# Patient Record
Sex: FEMALE | Race: WHITE | Employment: UNEMPLOYED | URBAN - METROPOLITAN AREA
[De-identification: names, ages, dates, MRNs, and addresses within clinical notes are randomized per-mention and may not be internally consistent; named-entity substitution may affect disease eponyms.]

---
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
## 2024-01-25 ENCOUNTER — OFFICE VISIT (OUTPATIENT)
Dept: BARIATRICS/WEIGHT MGMT | Age: 19
End: 2024-01-25
Payer: COMMERCIAL

## 2024-01-25 VITALS
RESPIRATION RATE: 18 BRPM | SYSTOLIC BLOOD PRESSURE: 120 MMHG | OXYGEN SATURATION: 98 % | HEIGHT: 64 IN | BODY MASS INDEX: 50.02 KG/M2 | HEART RATE: 88 BPM | DIASTOLIC BLOOD PRESSURE: 78 MMHG | WEIGHT: 293 LBS

## 2024-01-25 DIAGNOSIS — Q79.60 EHLERS-DANLOS SYNDROME: ICD-10-CM

## 2024-01-25 DIAGNOSIS — E66.9 DIABETES MELLITUS TYPE 2 IN OBESE (HCC): ICD-10-CM

## 2024-01-25 DIAGNOSIS — E66.01 MORBID OBESITY WITH BMI OF 50.0-59.9, ADULT (HCC): ICD-10-CM

## 2024-01-25 DIAGNOSIS — K21.9 CHRONIC GERD: ICD-10-CM

## 2024-01-25 DIAGNOSIS — E11.69 DIABETES MELLITUS TYPE 2 IN OBESE (HCC): ICD-10-CM

## 2024-01-25 DIAGNOSIS — Z01.818 PREOPERATIVE CLEARANCE: Primary | ICD-10-CM

## 2024-01-25 PROCEDURE — G8427 DOCREV CUR MEDS BY ELIG CLIN: HCPCS | Performed by: SURGERY

## 2024-01-25 PROCEDURE — 99205 OFFICE O/P NEW HI 60 MIN: CPT | Performed by: SURGERY

## 2024-01-25 PROCEDURE — 2022F DILAT RTA XM EVC RTNOPTHY: CPT | Performed by: SURGERY

## 2024-01-25 PROCEDURE — 3046F HEMOGLOBIN A1C LEVEL >9.0%: CPT | Performed by: SURGERY

## 2024-01-25 PROCEDURE — 1036F TOBACCO NON-USER: CPT | Performed by: SURGERY

## 2024-01-25 PROCEDURE — G8417 CALC BMI ABV UP PARAM F/U: HCPCS | Performed by: SURGERY

## 2024-01-25 PROCEDURE — G8484 FLU IMMUNIZE NO ADMIN: HCPCS | Performed by: SURGERY

## 2024-01-25 RX ORDER — DULAGLUTIDE 0.75 MG/.5ML
INJECTION, SOLUTION SUBCUTANEOUS
COMMUNITY
Start: 2024-01-05

## 2024-01-25 RX ORDER — LISINOPRIL 5 MG/1
TABLET ORAL
COMMUNITY
Start: 2022-02-16

## 2024-01-25 RX ORDER — INSULIN LISPRO 200 [IU]/ML
INJECTION, SOLUTION SUBCUTANEOUS
COMMUNITY

## 2024-01-25 RX ORDER — INSULIN LISPRO 100 [IU]/ML
INJECTION, SOLUTION INTRAVENOUS; SUBCUTANEOUS
COMMUNITY
Start: 2022-02-16

## 2024-01-25 RX ORDER — INSULIN GLARGINE 100 [IU]/ML
INJECTION, SOLUTION SUBCUTANEOUS
COMMUNITY

## 2024-01-25 NOTE — PROGRESS NOTES
Joe Khan is a 18 y.o. female with a date of birth of 2005.    Vitals:    01/25/24 0842   BP: 120/78   Pulse: 88   Resp: 18   SpO2: 98%   BMI: Body mass index is 56.51 kg/m². Obesity Classification: Class III    Weight History:   Wt Readings from Last 3 Encounters:   01/25/24 (!) 149.3 kg (329 lb 3.2 oz) (>99 %, Z= 2.98)*   04/09/21 (!) 155.6 kg (343 lb) (>99 %, Z= 2.87)*   02/10/19 (!) 131.5 kg (290 lb) (>99 %, Z= 3.01)*     * Growth percentiles are based on CDC (Girls, 2-20 Years) data.     Patient's lowest adult weight was 298 lbs at age 17.     Patient's highest adult weight was 340 lbs at age 15.     Patient has participated in the following weight loss programs: Peng Restriction, Keto, LC and Nutrition Counseling w/ Dietitian.   Patient has participated in meal replacement/liquid diets.  Patient has participated in weight loss medications- was on Trulicity but felt like she wanted to eat more on it so stopped it 2 weeks ago.    Patient is not lactose intolerant.  Patient does not have Anabaptist/cultural food concerns. Patient does not have food allergies. Patient does tolerate artificial sweeteners.     24 hour recall/food frequency chart:  *usually snacks a lot more  Breakfast: yes. 8a cinnamon rolls (2)  Snack: yes. 1030a cheez its  Lunch: yes. 330p chicken salad on croissant w/ grape salad  Snack: no.   Dinner: yes. 7p grilled chicken salad w/ ranch  Snack: no.   Drinks throughout the day: water / diet soda / diet sports drinks / diet energy drinks / sf fruit juice  Do you drink alcohol? No.     Patient does meet the criteria for binge eating disorder. Patient does have grazing. Patient does not have night eating. Patient does have a history of emotional eating or eating out of boredom.    Surgery  Patient does feel confident in her ability to make these changes.  The patient's expectations of post-surgical eating habits - voices understanding.    Patient states she does understand the consequences

## 2024-01-25 NOTE — PROGRESS NOTES
[facility]   [facility]  Prince Harris MD, FACS, [facility]  [address], [address]    [facility] [address] .  Phone: [phone]  Fax: [phone]       Chief Complaint   Patient presents with    Bariatric, Initial Visit     NP SHERYL Rice           HPI:    Joe Khan is a very pleasant 18 y.o. obese female ,   Body mass index is 56.51 kg/m².  And multiple medical problems who is presenting for weight loss surgery evaluation and consultation by Dr. Pompa, Tania Sosa.    Patient has been struggling for several years now with obesity. Patient feels the weight is an obstacle to achieve and perform things in daily living as well risk on health.     Tries to diet, and exercise but can't keep the weight off.  Patient tried Peng Restriction, Keto, LC and Nutrition Counseling w/ Dietitian.   Patient has participated in meal replacement/liquid diets.  Patient has participated in weight loss medications- was on Trulicity but felt like she wanted to eat more on it so stopped it 2 weeks ago and other regimens, but with no sustainable weight loss.     Patient  is very determined to lose weight and be healthy, and is interested in surgical weight loss for future weight loss.   .    Otherwise patient denies any nausea, vomiting, fevers, chills, shortness of breath, chest pain, constipation or urinary symptoms.        Obesity related problems Joe is dealing with:  Patient Active Problem List    Diagnosis Date Noted    Preoperative clearance 01/25/2024    Diabetes mellitus type 2 in obese ([facility]) 01/25/2024    Morbid obesity with BMI of 50.0-59.9, adult ([facility]) 01/25/2024    Chronic GERD 01/25/2024    García-Danlos syndrome 01/25/2024           Pain Assessment   Denies any abdominal pain     Past Medical History:   Diagnosis Date    Anxiety     Diabetes mellitus type 2 in obese ([facility]) 01/25/2024    EDS (García-Danlos syndrome)     Urinary incontinence      Past Surgical History:   Procedure

## 2024-01-25 NOTE — PATIENT INSTRUCTIONS
Patient received dietary handouts and education.        -Plan for Laparoscopic sleeve gastrectomy      Pre-operative work up Ordered:    - F/U in 4 weeks.   - Nutrition Labs.   - Protein Shake Trial.  - Psych Evaluation.   - Cardiac Clearance.  - EGD (endoscopy to check your stomach).  - Support Group Attendance.   - Obtain letter of medical necessity (PCP Letter).   - Quit Smoking,  Alcohol, Caffeine and Carbonated Drinks  - Obtain records for Weight History 2 yrs.   - Start Regular Exercise and track your activities.   - Start Tracking your food Intake and follow dietary guidelines.   - Avoid Pregnancy for 2 yrs from date of surgery. (for female patients in childbearing age)        Patient advised that its their responsibility to follow up for studies, referrals and/or labs ordered today.

## 2024-01-30 NOTE — PROGRESS NOTES
regular physical exercise for at least 30 minutes 3-5 times per week.  4. Follow-up on battery of labs ordered by bariatric surgery clinic, including CBC, CMP, HbA1c, lipid panel, and TSH.  5. If no concerning findings are seen on TTE, she is welcome to follow-up with me in the future on an as needed basis.       Scribe's attestation:  This note was scribed in the presence of Carlin Sterling DO by Melissa Singer RN         It is a pleasure to assist in the care of Joe Khan. Please call with any questions.  The scribe’s documentation has been prepared under my direction and personally reviewed by me in its entirety.  I confirm that the note above accurately reflects all work, treatment, procedures, and medical decision making performed by me.  I, Dr. Carlin Sterling, personally performed the services described in this documentation as scribed by Melissa Singer RN in my presence, and it is both accurate and complete to the best of our ability.         Carlin Sterling DO  [facility]  [phone] [place] Office  [phone] [facility]

## 2024-01-31 ENCOUNTER — OFFICE VISIT (OUTPATIENT)
Dept: CARDIOLOGY CLINIC | Age: 19
End: 2024-01-31

## 2024-01-31 VITALS
SYSTOLIC BLOOD PRESSURE: 128 MMHG | WEIGHT: 293 LBS | HEART RATE: 109 BPM | HEIGHT: 64 IN | OXYGEN SATURATION: 96 % | DIASTOLIC BLOOD PRESSURE: 78 MMHG | BODY MASS INDEX: 50.02 KG/M2

## 2024-01-31 DIAGNOSIS — Q79.60 EHLERS-DANLOS SYNDROME: ICD-10-CM

## 2024-01-31 DIAGNOSIS — E66.01 MORBID OBESITY (HCC): ICD-10-CM

## 2024-01-31 DIAGNOSIS — Z01.818 PRE-OP EVALUATION: ICD-10-CM

## 2024-01-31 DIAGNOSIS — R00.2 PALPITATIONS: ICD-10-CM

## 2024-01-31 DIAGNOSIS — E11.9 TYPE 2 DIABETES MELLITUS WITHOUT COMPLICATION, UNSPECIFIED WHETHER LONG TERM INSULIN USE (HCC): ICD-10-CM

## 2024-01-31 DIAGNOSIS — Z76.89 ESTABLISHING CARE WITH NEW DOCTOR, ENCOUNTER FOR: Primary | ICD-10-CM

## 2024-01-31 RX ORDER — ATORVASTATIN CALCIUM 20 MG/1
1 TABLET, FILM COATED ORAL DAILY
COMMUNITY
Start: 2023-06-20

## 2024-01-31 NOTE — PATIENT INSTRUCTIONS
Continue current medication regime as prescribed.   Continue to encourage heart healthy, low sodium diet and regular physical exercise for at least 30 minutes a minimum of 3-5 times per week.   Patient is low risk for an adverse perioperative cardiovascular event in the setting of an intermediate risk surgical procedure.  No additional cardiac evaluation is currently indicated prior to proceeding with the planned surgery.    Follow up with me as needed.

## 2024-02-03 ASSESSMENT — ENCOUNTER SYMPTOMS
ABDOMINAL PAIN: 0
DIARRHEA: 0
NAUSEA: 0
SHORTNESS OF BREATH: 0
VOMITING: 0
PHOTOPHOBIA: 0
EYE PAIN: 0
COUGH: 0
RHINORRHEA: 0
CONSTIPATION: 0
SORE THROAT: 0

## 2024-02-05 ENCOUNTER — TELEPHONE (OUTPATIENT)
Dept: CARDIOLOGY CLINIC | Age: 19
End: 2024-02-05

## 2024-02-05 NOTE — TELEPHONE ENCOUNTER
Called patient relayed [facility] message. Patient verbally understood. Number given to central scheduling to make appointment for TTE.

## 2024-02-05 NOTE — TELEPHONE ENCOUNTER
----- Message from Carlin Sterling DO sent at 2/3/2024 12:08 PM EST -----  Regarding: TTE  When we saw this patient in clinic last week, I didn't realize that she carried a diagnosis of García-Danlos syndrome.    It dos not appear that she has ever had a baseline TTE.  She should have a baseline TTE for complete assessment of her cardiac structure and function which I've ordered, but we need to reach out to her to explain this.  If no high risk findings are seen on TTE, she would be considered low risk for surgery.  Thanks, Carlin.

## 2024-02-22 ENCOUNTER — PREP FOR PROCEDURE (OUTPATIENT)
Dept: BARIATRICS/WEIGHT MGMT | Age: 19
End: 2024-02-22

## 2024-02-22 ENCOUNTER — OFFICE VISIT (OUTPATIENT)
Dept: BARIATRICS/WEIGHT MGMT | Age: 19
End: 2024-02-22
Payer: COMMERCIAL

## 2024-02-22 VITALS
HEIGHT: 64 IN | DIASTOLIC BLOOD PRESSURE: 78 MMHG | WEIGHT: 293 LBS | OXYGEN SATURATION: 98 % | SYSTOLIC BLOOD PRESSURE: 124 MMHG | HEART RATE: 109 BPM | RESPIRATION RATE: 18 BRPM | BODY MASS INDEX: 50.02 KG/M2

## 2024-02-22 DIAGNOSIS — E66.9 DIABETES MELLITUS TYPE 2 IN OBESE (HCC): ICD-10-CM

## 2024-02-22 DIAGNOSIS — E11.69 DIABETES MELLITUS TYPE 2 IN OBESE (HCC): ICD-10-CM

## 2024-02-22 DIAGNOSIS — K21.9 CHRONIC GERD: ICD-10-CM

## 2024-02-22 DIAGNOSIS — E66.01 MORBID OBESITY WITH BMI OF 50.0-59.9, ADULT (HCC): Primary | ICD-10-CM

## 2024-02-22 PROCEDURE — G8427 DOCREV CUR MEDS BY ELIG CLIN: HCPCS | Performed by: SURGERY

## 2024-02-22 PROCEDURE — 99214 OFFICE O/P EST MOD 30 MIN: CPT | Performed by: SURGERY

## 2024-02-22 PROCEDURE — 1036F TOBACCO NON-USER: CPT | Performed by: SURGERY

## 2024-02-22 PROCEDURE — 3046F HEMOGLOBIN A1C LEVEL >9.0%: CPT | Performed by: SURGERY

## 2024-02-22 PROCEDURE — 2022F DILAT RTA XM EVC RTNOPTHY: CPT | Performed by: SURGERY

## 2024-02-22 PROCEDURE — G8484 FLU IMMUNIZE NO ADMIN: HCPCS | Performed by: SURGERY

## 2024-02-22 PROCEDURE — G8417 CALC BMI ABV UP PARAM F/U: HCPCS | Performed by: SURGERY

## 2024-02-22 RX ORDER — METFORMIN HYDROCHLORIDE 500 MG/1
500 TABLET, EXTENDED RELEASE ORAL
COMMUNITY
Start: 2022-02-16

## 2024-02-22 RX ORDER — BLOOD SUGAR DIAGNOSTIC
1 STRIP MISCELLANEOUS
COMMUNITY
Start: 2022-02-25

## 2024-02-22 RX ORDER — CALCIUM CITRATE/VITAMIN D3 200MG-6.25
1 TABLET ORAL DAILY
COMMUNITY
Start: 2022-02-17

## 2024-02-22 RX ORDER — GLUCAGON 3 MG/1
POWDER NASAL
COMMUNITY
Start: 2022-02-16

## 2024-02-22 RX ORDER — ACYCLOVIR 400 MG/1
TABLET ORAL
COMMUNITY
Start: 2024-02-01

## 2024-02-22 NOTE — PROGRESS NOTES
[facility]   [facility]  Prince Harris MD, FACS, [address], [address]    [facility] [address] .  Phone: [phone]  Fax: [phone]          Chief Complaint   Patient presents with    Obesity     2nd pre-surg         HPI:     Joe Khan is a very pleasant 18 y.o. female with Body mass index is 56.3 kg/m². / Chronic Obesity.     Joe has been struggling for several years now with obesity. Joe feels the weight is an obstacle to achieve and perform things in daily living as well risk on health.     Patient  is very determined to lose weight and be healthy, and is working towards  surgical weight loss to achieve this goal. Pre-operative clearance and work up pending. Working hard to keep good dietary habits as well level of activity.  Patient denies any nausea, vomiting, fevers, chills, shortness of breath, chest pain, cough, constipation or difficulty urinating.    Pain Assessment   Denies any abdominal pain       Past Medical History:   Diagnosis Date    Anxiety     Diabetes mellitus type 2 in obese ([facility]) 01/25/2024    EDS (García-Danlos syndrome)     Urinary incontinence      Past Surgical History:   Procedure Laterality Date    TONSILLECTOMY AND ADENOIDECTOMY       Family History   Problem Relation Age of Onset    Elevated Lipids Mother     Diabetes Mother     Hypertension Father     Elevated Lipids Father     Mental Illness Father     Heart Disease Maternal Grandmother     Heart Disease Maternal Grandfather      Social History     Tobacco Use    Smoking status: Never    Smokeless tobacco: Never   Substance Use Topics    Alcohol use: No     I counseled the patient on the importance of not smoking and risks of ETOH.   No Known Allergies  Vitals:    02/22/24 0822   BP: 124/78   Pulse: (!) 109   Resp: 18   SpO2: 98%   Weight: (!) 148.8 kg (328 lb)   Height: 1.626 m (5' 4\")       Body mass index is 56.3 kg/m².    Lab Results   Component Value Date/Time

## 2024-02-22 NOTE — PROGRESS NOTES
Joe Khan lost 1.2 lbs over 1 mos.     Breakfast: sausage/alcaraz + toast/ english muffin    Snack: cheez its    Lunch: cheeseburger     Snack: none    Dinner: chx + veg + sometimes rice/potato    Snack: none    Is pt consuming smaller portions? yes portioned plate     Is pt consuming at least 64 oz of fluids per day? yes 4-5 sf splash water + water     Is pt consuming carbonated, caffeinated, or sugary beverages? yes diet coke 1-2/day    Has pt sampled Unjury and/or Nectar protein? Discussed     Has patient attended a support group? Scheduled  April 8th zoom     Exercise: none, wants to start walking     Plan/Recommendations:   - Avoid carbonated beverages  - Try protein powder  - Focus on protein at all meals and snacks     Handouts: none    RODNEY JO, MS, RD, LD

## 2024-02-23 RX ORDER — SODIUM CHLORIDE 0.9 % (FLUSH) 0.9 %
5-40 SYRINGE (ML) INJECTION PRN
Status: CANCELLED | OUTPATIENT
Start: 2024-02-23

## 2024-02-23 RX ORDER — SODIUM CHLORIDE 9 MG/ML
25 INJECTION, SOLUTION INTRAVENOUS PRN
Status: CANCELLED | OUTPATIENT
Start: 2024-02-23

## 2024-02-23 RX ORDER — SODIUM CHLORIDE 0.9 % (FLUSH) 0.9 %
5-40 SYRINGE (ML) INJECTION EVERY 12 HOURS SCHEDULED
Status: CANCELLED | OUTPATIENT
Start: 2024-02-23

## 2024-02-24 PROBLEM — Z01.818 PREOPERATIVE CLEARANCE: Status: RESOLVED | Noted: 2024-01-25 | Resolved: 2024-02-24

## 2024-03-04 ENCOUNTER — TELEPHONE (OUTPATIENT)
Dept: BARIATRICS/WEIGHT MGMT | Age: 19
End: 2024-03-04

## 2024-03-21 ENCOUNTER — OFFICE VISIT (OUTPATIENT)
Dept: BARIATRICS/WEIGHT MGMT | Age: 19
End: 2024-03-21
Payer: COMMERCIAL

## 2024-03-21 VITALS
HEART RATE: 118 BPM | DIASTOLIC BLOOD PRESSURE: 72 MMHG | WEIGHT: 293 LBS | RESPIRATION RATE: 18 BRPM | OXYGEN SATURATION: 98 % | HEIGHT: 64 IN | BODY MASS INDEX: 50.02 KG/M2 | SYSTOLIC BLOOD PRESSURE: 120 MMHG

## 2024-03-21 DIAGNOSIS — K21.9 CHRONIC GERD: Primary | ICD-10-CM

## 2024-03-21 DIAGNOSIS — E66.9 DIABETES MELLITUS TYPE 2 IN OBESE (HCC): ICD-10-CM

## 2024-03-21 DIAGNOSIS — E66.01 MORBID OBESITY WITH BMI OF 50.0-59.9, ADULT (HCC): ICD-10-CM

## 2024-03-21 DIAGNOSIS — E11.69 DIABETES MELLITUS TYPE 2 IN OBESE (HCC): ICD-10-CM

## 2024-03-21 PROCEDURE — 2022F DILAT RTA XM EVC RTNOPTHY: CPT | Performed by: SURGERY

## 2024-03-21 PROCEDURE — G8417 CALC BMI ABV UP PARAM F/U: HCPCS | Performed by: SURGERY

## 2024-03-21 PROCEDURE — G8427 DOCREV CUR MEDS BY ELIG CLIN: HCPCS | Performed by: SURGERY

## 2024-03-21 PROCEDURE — 99214 OFFICE O/P EST MOD 30 MIN: CPT | Performed by: SURGERY

## 2024-03-21 PROCEDURE — 3046F HEMOGLOBIN A1C LEVEL >9.0%: CPT | Performed by: SURGERY

## 2024-03-21 PROCEDURE — G8484 FLU IMMUNIZE NO ADMIN: HCPCS | Performed by: SURGERY

## 2024-03-21 PROCEDURE — 1036F TOBACCO NON-USER: CPT | Performed by: SURGERY

## 2024-03-21 NOTE — PROGRESS NOTES
Joe Khan lost 12 lbs over 1 mos.    Is pt eating at least 4 times everyday? yes 4X per day  B: steak + egg + cheese sandwich  L: grilled chx ranch wrap   S: yogurt + granola + strawberries   D: meat + veg + sometimes potato  S: occasionally cookie/ brownie - watching portions    Is pt eating a lean protein source with all meals and snacks? yes      Has pt decreased their portions using the plate method? yes      Is pt choosing low fat/sugar free options? yes      Is pt drinking at least 64 oz of clear liquids everyday? yes powerade zero , water, CL    Has pt stopped drinking carbonation, caffeinated, and sugar sweetened beverages? No, diet coke    Has pt sampled Unjury and/or Nectar protein? yes tried one, to try more    Has pt attended a support group? Scheduled Apr 8th    Participating in intentional exercise? Not yet, wants to start walking    Plan/Recommendations:   - Continue diet   - Avoid carbonated beverages  - Add in exercise as able    Handouts: none    RODNEY JO, MS, RD, LD  
stomach.      Prediabetes / Diabetes Mellitus Type II in Obese:   [x] Continue to make dietary and lifestyle modifications.  [x] Reviewed the importance of checking blood sugars.  [x] Continue to follow up with their PCP for medication management and monitoring.         Patient advised that its their responsibility to follow up for studies, referrals and/or labs ordered today.

## 2024-04-01 ENCOUNTER — PREP FOR PROCEDURE (OUTPATIENT)
Dept: BARIATRICS/WEIGHT MGMT | Age: 19
End: 2024-04-01

## 2024-04-02 RX ORDER — SODIUM CHLORIDE 9 MG/ML
25 INJECTION, SOLUTION INTRAVENOUS PRN
Status: CANCELLED | OUTPATIENT
Start: 2024-04-02

## 2024-04-02 RX ORDER — SODIUM CHLORIDE 0.9 % (FLUSH) 0.9 %
5-40 SYRINGE (ML) INJECTION PRN
Status: CANCELLED | OUTPATIENT
Start: 2024-04-02

## 2024-04-02 RX ORDER — SODIUM CHLORIDE 0.9 % (FLUSH) 0.9 %
5-40 SYRINGE (ML) INJECTION EVERY 12 HOURS SCHEDULED
Status: CANCELLED | OUTPATIENT
Start: 2024-04-02

## 2024-04-03 NOTE — PROGRESS NOTES
Patient Reached:  Yes_X__   No___    Voicemail Instructions Given: Yes_X__  No___  # Called: [phone]      Date: 4/12/2024      *Arrival Time Per Office      Location: [address] [place], [place]       -Please follow a clear liquid diet the day before the procedure.    -No liquids after midnight. Including no gum, candy or mints.    -Do not take any medications the day of the procedure.    -Follow all instructions that the office has given you.    -You will need a responsible adult to stay on site, and take you home after the procedure.    -Bring a complete list of all your medications and supplements that you currently take. Please include the name and dose of each.    -Dress comfortably.    -Bring Insurance Card and Photo ID.    -Any questions or concerns call Dr. Harris's office at [phone]      -Other            VISITOR POLICY(subject to change):    The current policy is 2 visitors per patient.There are no children allowed.Mask at discretion of facility. Visiting hours are 8a-8p.Overnight visitors will be at the discretion of the nurse. All policies are subject to change.

## 2024-04-08 ENCOUNTER — TELEPHONE (OUTPATIENT)
Dept: BARIATRICS/WEIGHT MGMT | Age: 19
End: 2024-04-08

## 2024-04-08 NOTE — TELEPHONE ENCOUNTER
Spoke with pt reminding pt of egd this Friday Reminded pt of arrival time/ clear liquid diet/ NPO after midnight.  Pt verbalized understanding.

## 2024-04-11 ENCOUNTER — HOSPITAL ENCOUNTER (OUTPATIENT)
Age: 19
Discharge: HOME | End: 2024-04-11
Payer: COMMERCIAL

## 2024-04-11 ENCOUNTER — OFFICE VISIT (OUTPATIENT)
Dept: BARIATRICS/WEIGHT MGMT | Age: 19
End: 2024-04-11
Payer: COMMERCIAL

## 2024-04-11 VITALS
OXYGEN SATURATION: 98 % | WEIGHT: 293 LBS | HEIGHT: 64 IN | HEART RATE: 86 BPM | RESPIRATION RATE: 18 BRPM | BODY MASS INDEX: 50.02 KG/M2 | DIASTOLIC BLOOD PRESSURE: 68 MMHG | SYSTOLIC BLOOD PRESSURE: 118 MMHG

## 2024-04-11 DIAGNOSIS — K21.9 CHRONIC GERD: ICD-10-CM

## 2024-04-11 DIAGNOSIS — E66.01 MORBID OBESITY WITH BMI OF 50.0-59.9, ADULT (HCC): ICD-10-CM

## 2024-04-11 DIAGNOSIS — E11.69 TYPE 2 DIABETES MELLITUS WITH OBESITY (HCC): ICD-10-CM

## 2024-04-11 DIAGNOSIS — E66.01 MORBID OBESITY WITH BMI OF 50.0-59.9, ADULT (HCC): Primary | ICD-10-CM

## 2024-04-11 DIAGNOSIS — E66.9 TYPE 2 DIABETES MELLITUS WITH OBESITY (HCC): ICD-10-CM

## 2024-04-11 DIAGNOSIS — Q79.60 EHLERS-DANLOS SYNDROME: ICD-10-CM

## 2024-04-11 DIAGNOSIS — Z01.818 PREOPERATIVE CLEARANCE: ICD-10-CM

## 2024-04-11 LAB
INR PPP: 0.97 (ref 0.85–1.15)
PROTHROMBIN TIME: 13.1 SEC (ref 11.9–14.9)

## 2024-04-11 PROCEDURE — 83550 IRON BINDING TEST: CPT

## 2024-04-11 PROCEDURE — 3046F HEMOGLOBIN A1C LEVEL >9.0%: CPT | Performed by: SURGERY

## 2024-04-11 PROCEDURE — 2022F DILAT RTA XM EVC RTNOPTHY: CPT | Performed by: SURGERY

## 2024-04-11 PROCEDURE — 80053 COMPREHEN METABOLIC PANEL: CPT

## 2024-04-11 PROCEDURE — 99214 OFFICE O/P EST MOD 30 MIN: CPT | Performed by: SURGERY

## 2024-04-11 PROCEDURE — 85025 COMPLETE CBC W/AUTO DIFF WBC: CPT

## 2024-04-11 PROCEDURE — G8427 DOCREV CUR MEDS BY ELIG CLIN: HCPCS | Performed by: SURGERY

## 2024-04-11 PROCEDURE — 84590 ASSAY OF VITAMIN A: CPT

## 2024-04-11 PROCEDURE — 84446 ASSAY OF VITAMIN E: CPT

## 2024-04-11 PROCEDURE — 83036 HEMOGLOBIN GLYCOSYLATED A1C: CPT

## 2024-04-11 PROCEDURE — 36415 COLL VENOUS BLD VENIPUNCTURE: CPT

## 2024-04-11 PROCEDURE — G8417 CALC BMI ABV UP PARAM F/U: HCPCS | Performed by: SURGERY

## 2024-04-11 PROCEDURE — 84425 ASSAY OF VITAMIN B-1: CPT

## 2024-04-11 PROCEDURE — 84443 ASSAY THYROID STIM HORMONE: CPT

## 2024-04-11 PROCEDURE — 85610 PROTHROMBIN TIME: CPT

## 2024-04-11 PROCEDURE — 82306 VITAMIN D 25 HYDROXY: CPT

## 2024-04-11 PROCEDURE — 1036F TOBACCO NON-USER: CPT | Performed by: SURGERY

## 2024-04-11 PROCEDURE — 80061 LIPID PANEL: CPT

## 2024-04-11 PROCEDURE — 83540 ASSAY OF IRON: CPT

## 2024-04-11 PROCEDURE — 82607 VITAMIN B-12: CPT

## 2024-04-11 PROCEDURE — 82746 ASSAY OF FOLIC ACID SERUM: CPT

## 2024-04-11 NOTE — PROGRESS NOTES
Joe Khan lost 3 lbs over 1 month.     Breakfast: EM w/ sausage or alcaraz   Snack: None  Lunch: Jaylin chix sub from [employer]  Snack: Yogurt OR none  Dinner: Grilled chix + green beans   Snack: None    Is pt consuming smaller portions?  Generally taking less    Is pt consuming at least 64 oz of fluids per day?  4-7 bottles powerade zero, water, CL     Is pt consuming carbonated, caffeinated, or sugary beverages? diet coke 1 bottle every other day    Has pt sampled Unjury and/or Nectar protein? Yes, likes vanilla, chocolate, akins     Has patient attended a support group? Scheduled  5/23 zoom    Exercise: walking in evenings     Plan/Recommendations:   - Choose items with 10 grams or less from sugar and fat per serving   - Eliminate diet coke   - Attend Support Group     Handouts: SG24    Zelda Connolly RD, LD    
No scleral icterus.   Neck:No JVD present.   Pulmonary/Chest: Effort normal. No accessory muscle usage or stridor. No apnea. No respiratory distress.   Cardiovascular: Normal rate and no JVD.   Abdominal: Normal appearance. Patient exhibits no distension.    Musculoskeletal: Normal range of motion. Patient exhibits no edema.   Neurological: Patient is alert and oriented to person, place, and time.  Skin: Skin is warm and dry. No abrasion and no rash noted. Patient is not diaphoretic. No cyanosis or erythema.   Psychiatric: Patient has a normal mood and affect. Speech is normal and behavior is normal.       A/P    Joe Khan is 18 y.o. female, Body mass index is 53.73 kg/m². pre surgery, has  since last visit. The patient underwent extensive dietary counseling.  I have reviewed, discussed and agree with the dietary plan by the registered dietitian . Patient is trying hard to keep good dietary and behavior modifications. Patient is monitoring portion sizes, food choices and liquid calories.  Patient is trying to exercise regularly as much as possible.    Obesity as a disease is considered a high risk to patients overall health and should therefore be considered a high risk disease state.   Advised the patient that not getting there weight under control, that could increase risk of complications/worsening of those conditions on the long-term. (Goal of weight loss surgery is to alleviate/control some of those co-morbidities)    Now with Covid-19 pandemic, CDC and health authorities does classify obese patients as vulnerable and high risk as well.  Which makes weight loss a priority for improvement of their wellbeing and overall health.     I encouraged the patient to continue exercise and keeping healthy eating habits.  Discussed pre-op labs and work up till now. Also counseled the patient extensively on Surgery.     I did explain thoroughly to the patient that compliance with pre- and post op diet and other

## 2024-04-11 NOTE — PATIENT INSTRUCTIONS
Patient received dietary handouts and education.    Plan/Recommendations:   - Choose items with 10 grams or less from sugar and fat per serving   - Eliminate diet coke   - Attend Support Group

## 2024-04-12 ENCOUNTER — ANESTHESIA (OUTPATIENT)
Dept: ENDOSCOPY | Age: 19
End: 2024-04-12
Payer: COMMERCIAL

## 2024-04-12 ENCOUNTER — HOSPITAL ENCOUNTER (OUTPATIENT)
Age: 19
Setting detail: OUTPATIENT SURGERY
Discharge: HOME | End: 2024-04-12
Attending: SURGERY | Admitting: SURGERY
Payer: COMMERCIAL

## 2024-04-12 ENCOUNTER — ANESTHESIA EVENT (OUTPATIENT)
Dept: ENDOSCOPY | Age: 19
End: 2024-04-12
Payer: COMMERCIAL

## 2024-04-12 VITALS
SYSTOLIC BLOOD PRESSURE: 119 MMHG | WEIGHT: 293 LBS | RESPIRATION RATE: 24 BRPM | DIASTOLIC BLOOD PRESSURE: 83 MMHG | BODY MASS INDEX: 50.02 KG/M2 | HEART RATE: 92 BPM | TEMPERATURE: 98.2 F | OXYGEN SATURATION: 98 % | HEIGHT: 64 IN

## 2024-04-12 DIAGNOSIS — K21.9 CHRONIC GERD: ICD-10-CM

## 2024-04-12 LAB
25(OH)D3 SERPL-MCNC: 28.7 NG/ML
ALBUMIN SERPL-MCNC: 4.4 G/DL (ref 3.4–5)
ALBUMIN/GLOB SERPL: 1.5 {RATIO} (ref 1.1–2.2)
ALP SERPL-CCNC: 70 U/L (ref 40–129)
ALT SERPL-CCNC: 23 U/L (ref 10–40)
ANION GAP SERPL CALCULATED.3IONS-SCNC: 16 MMOL/L (ref 3–16)
AST SERPL-CCNC: 21 U/L (ref 15–37)
BASOPHILS # BLD: 0.1 K/UL (ref 0–0.2)
BASOPHILS NFR BLD: 0.8 %
BILIRUB SERPL-MCNC: 0.3 MG/DL (ref 0–1)
BUN SERPL-MCNC: 8 MG/DL (ref 7–20)
CALCIUM SERPL-MCNC: 9.5 MG/DL (ref 8.3–10.6)
CHLORIDE SERPL-SCNC: 99 MMOL/L (ref 99–110)
CHOLEST SERPL-MCNC: 203 MG/DL (ref 0–199)
CO2 SERPL-SCNC: 23 MMOL/L (ref 21–32)
CREAT SERPL-MCNC: 0.5 MG/DL (ref 0.6–1.1)
DEPRECATED RDW RBC AUTO: 14.1 % (ref 12.4–15.4)
EOSINOPHIL # BLD: 0.2 K/UL (ref 0–0.6)
EOSINOPHIL NFR BLD: 2.2 %
EST. AVERAGE GLUCOSE BLD GHB EST-MCNC: 297.7 MG/DL
FOLATE SERPL-MCNC: 8.71 NG/ML (ref 4.78–24.2)
GFR SERPLBLD CREATININE-BSD FMLA CKD-EPI: >90 ML/MIN/{1.73_M2}
GLUCOSE BLD-MCNC: 207 MG/DL (ref 70–99)
GLUCOSE BLD-MCNC: 287 MG/DL (ref 70–99)
GLUCOSE SERPL-MCNC: 233 MG/DL (ref 70–99)
HBA1C MFR BLD: 12 %
HCG UR QL: NEGATIVE
HCT VFR BLD AUTO: 42.9 % (ref 36–48)
HDLC SERPL-MCNC: 31 MG/DL (ref 40–60)
HGB BLD-MCNC: 14.5 G/DL (ref 12–16)
IRON SATN MFR SERPL: 16 % (ref 15–50)
IRON SERPL-MCNC: 53 UG/DL (ref 37–145)
LDLC SERPL CALC-MCNC: 139 MG/DL
LYMPHOCYTES # BLD: 2.6 K/UL (ref 1–5.1)
LYMPHOCYTES NFR BLD: 28 %
MCH RBC QN AUTO: 27.5 PG (ref 26–34)
MCHC RBC AUTO-ENTMCNC: 33.9 G/DL (ref 31–36)
MCV RBC AUTO: 81.3 FL (ref 80–100)
MONOCYTES # BLD: 0.3 K/UL (ref 0–1.3)
MONOCYTES NFR BLD: 3.6 %
NEUTROPHILS # BLD: 6.1 K/UL (ref 1.7–7.7)
NEUTROPHILS NFR BLD: 65.4 %
PERFORMED ON: ABNORMAL
PERFORMED ON: ABNORMAL
PLATELET # BLD AUTO: 425 K/UL (ref 135–450)
PMV BLD AUTO: 8.4 FL (ref 5–10.5)
POTASSIUM SERPL-SCNC: 4.3 MMOL/L (ref 3.5–5.1)
PROT SERPL-MCNC: 7.4 G/DL (ref 6.4–8.2)
RBC # BLD AUTO: 5.28 M/UL (ref 4–5.2)
SODIUM SERPL-SCNC: 138 MMOL/L (ref 136–145)
TIBC SERPL-MCNC: 328 UG/DL (ref 260–445)
TRIGL SERPL-MCNC: 163 MG/DL (ref 0–150)
TSH SERPL DL<=0.005 MIU/L-ACNC: 1.89 UIU/ML (ref 0.43–4)
VIT B12 SERPL-MCNC: 903 PG/ML (ref 211–911)
VLDLC SERPL CALC-MCNC: 33 MG/DL
WBC # BLD AUTO: 9.4 K/UL (ref 4–11)

## 2024-04-12 PROCEDURE — 6370000000 HC RX 637 (ALT 250 FOR IP): Performed by: STUDENT IN AN ORGANIZED HEALTH CARE EDUCATION/TRAINING PROGRAM

## 2024-04-12 PROCEDURE — 6360000002 HC RX W HCPCS: Performed by: NURSE ANESTHETIST, CERTIFIED REGISTERED

## 2024-04-12 PROCEDURE — 2500000003 HC RX 250 WO HCPCS: Performed by: NURSE ANESTHETIST, CERTIFIED REGISTERED

## 2024-04-12 PROCEDURE — 2709999900 HC NON-CHARGEABLE SUPPLY: Performed by: SURGERY

## 2024-04-12 PROCEDURE — 7100000000 HC PACU RECOVERY - FIRST 15 MIN: Performed by: SURGERY

## 2024-04-12 PROCEDURE — 7100000011 HC PHASE II RECOVERY - ADDTL 15 MIN: Performed by: SURGERY

## 2024-04-12 PROCEDURE — 7100000010 HC PHASE II RECOVERY - FIRST 15 MIN: Performed by: SURGERY

## 2024-04-12 PROCEDURE — 3700000000 HC ANESTHESIA ATTENDED CARE: Performed by: SURGERY

## 2024-04-12 PROCEDURE — 88305 TISSUE EXAM BY PATHOLOGIST: CPT

## 2024-04-12 PROCEDURE — 2580000003 HC RX 258: Performed by: SURGERY

## 2024-04-12 PROCEDURE — 3609012400 HC EGD TRANSORAL BIOPSY SINGLE/MULTIPLE: Performed by: SURGERY

## 2024-04-12 PROCEDURE — 7100000001 HC PACU RECOVERY - ADDTL 15 MIN: Performed by: SURGERY

## 2024-04-12 PROCEDURE — 84703 CHORIONIC GONADOTROPIN ASSAY: CPT

## 2024-04-12 PROCEDURE — 43239 EGD BIOPSY SINGLE/MULTIPLE: CPT | Performed by: SURGERY

## 2024-04-12 RX ORDER — SODIUM CHLORIDE 0.9 % (FLUSH) 0.9 %
5-40 SYRINGE (ML) INJECTION PRN
Status: DISCONTINUED | OUTPATIENT
Start: 2024-04-12 | End: 2024-04-12 | Stop reason: HOSPADM

## 2024-04-12 RX ORDER — OMEPRAZOLE 20 MG/1
20 CAPSULE, DELAYED RELEASE ORAL
Qty: 90 CAPSULE | Refills: 1 | Status: SHIPPED | OUTPATIENT
Start: 2024-04-12

## 2024-04-12 RX ORDER — PROPOFOL 10 MG/ML
INJECTION, EMULSION INTRAVENOUS PRN
Status: DISCONTINUED | OUTPATIENT
Start: 2024-04-12 | End: 2024-04-12 | Stop reason: SDUPTHER

## 2024-04-12 RX ORDER — SODIUM CHLORIDE 9 MG/ML
INJECTION, SOLUTION INTRAVENOUS CONTINUOUS
Status: DISCONTINUED | OUTPATIENT
Start: 2024-04-12 | End: 2024-04-12 | Stop reason: HOSPADM

## 2024-04-12 RX ORDER — GLUCAGON 1 MG/ML
1 KIT INJECTION PRN
Status: DISCONTINUED | OUTPATIENT
Start: 2024-04-12 | End: 2024-04-12 | Stop reason: HOSPADM

## 2024-04-12 RX ORDER — LIDOCAINE HYDROCHLORIDE 20 MG/ML
INJECTION, SOLUTION INFILTRATION; PERINEURAL PRN
Status: DISCONTINUED | OUTPATIENT
Start: 2024-04-12 | End: 2024-04-12 | Stop reason: SDUPTHER

## 2024-04-12 RX ORDER — ESMOLOL HYDROCHLORIDE 10 MG/ML
INJECTION INTRAVENOUS PRN
Status: DISCONTINUED | OUTPATIENT
Start: 2024-04-12 | End: 2024-04-12 | Stop reason: SDUPTHER

## 2024-04-12 RX ORDER — MIDAZOLAM HYDROCHLORIDE 1 MG/ML
INJECTION INTRAMUSCULAR; INTRAVENOUS PRN
Status: DISCONTINUED | OUTPATIENT
Start: 2024-04-12 | End: 2024-04-12 | Stop reason: SDUPTHER

## 2024-04-12 RX ORDER — DEXTROSE MONOHYDRATE 100 MG/ML
INJECTION, SOLUTION INTRAVENOUS CONTINUOUS PRN
Status: DISCONTINUED | OUTPATIENT
Start: 2024-04-12 | End: 2024-04-12 | Stop reason: HOSPADM

## 2024-04-12 RX ORDER — SODIUM CHLORIDE 0.9 % (FLUSH) 0.9 %
5-40 SYRINGE (ML) INJECTION EVERY 12 HOURS SCHEDULED
Status: DISCONTINUED | OUTPATIENT
Start: 2024-04-12 | End: 2024-04-12 | Stop reason: HOSPADM

## 2024-04-12 RX ORDER — KETAMINE HCL IN NACL, ISO-OSM 100MG/10ML
SYRINGE (ML) INJECTION PRN
Status: DISCONTINUED | OUTPATIENT
Start: 2024-04-12 | End: 2024-04-12 | Stop reason: SDUPTHER

## 2024-04-12 RX ORDER — SODIUM CHLORIDE 9 MG/ML
25 INJECTION, SOLUTION INTRAVENOUS PRN
Status: DISCONTINUED | OUTPATIENT
Start: 2024-04-12 | End: 2024-04-12 | Stop reason: HOSPADM

## 2024-04-12 RX ADMIN — Medication 25 MG: at 07:53

## 2024-04-12 RX ADMIN — PROPOFOL 30 MG: 10 INJECTION, EMULSION INTRAVENOUS at 07:57

## 2024-04-12 RX ADMIN — LIDOCAINE HYDROCHLORIDE 100 MG: 20 INJECTION, SOLUTION INFILTRATION; PERINEURAL at 07:53

## 2024-04-12 RX ADMIN — MIDAZOLAM 2 MG: 1 INJECTION INTRAMUSCULAR; INTRAVENOUS at 07:49

## 2024-04-12 RX ADMIN — PROPOFOL 80 MG: 10 INJECTION, EMULSION INTRAVENOUS at 07:53

## 2024-04-12 RX ADMIN — INSULIN HUMAN 5 UNITS: 100 INJECTION, SOLUTION PARENTERAL at 06:59

## 2024-04-12 RX ADMIN — PROPOFOL 30 MG: 10 INJECTION, EMULSION INTRAVENOUS at 07:55

## 2024-04-12 RX ADMIN — SODIUM CHLORIDE: 9 INJECTION, SOLUTION INTRAVENOUS at 06:51

## 2024-04-12 RX ADMIN — Medication 25 MG: at 07:54

## 2024-04-12 RX ADMIN — ESMOLOL HYDROCHLORIDE 10 MG: 10 INJECTION, SOLUTION INTRAVENOUS at 07:59

## 2024-04-12 ASSESSMENT — PAIN - FUNCTIONAL ASSESSMENT: PAIN_FUNCTIONAL_ASSESSMENT: 0-10

## 2024-04-12 NOTE — ANESTHESIA POSTPROCEDURE EVALUATION
Department of Anesthesiology  Postprocedure Note    Patient: Joe Khan  MRN: [id number]  YOB: 2005  Date of evaluation: 4/12/2024    Procedure Summary       Date: 04/12/24 Room / Location: [place] [address] / [facility]    Anesthesia Start: 0749 Anesthesia Stop: 0805    Procedure: ESOPHAGOGASTRODUODENOSCOPY BIOPSY (Abdomen) Diagnosis:       Chronic GERD      (Chronic GERD [K21.9])    Surgeons: Prince Harris MD Responsible Provider: Noe Carreon MD    Anesthesia Type: MAC ASA Status: 3            Anesthesia Type: No value filed.    Hemanth Phase I: Hemanth Score: 10    Hemanth Phase II: Hemanth Score: 10    Anesthesia Post Evaluation    Patient location during evaluation: PACU  Patient participation: complete - patient participated  Level of consciousness: awake and alert  Airway patency: patent  Nausea & Vomiting: no nausea and no vomiting  Cardiovascular status: blood pressure returned to baseline  Respiratory status: acceptable  Hydration status: euvolemic  Multimodal analgesia pain management approach  Pain management: adequate    No notable events documented.

## 2024-04-12 NOTE — PROGRESS NOTES
Pt arrived from ENDO to PACU bay 8. Reported received from ENDO staff. Pt arousable to voice. Pt on RA, NSR/ Tachy, and VSS.

## 2024-04-12 NOTE — PROGRESS NOTES
Reviewed problem list, assessment, H&P, and checklist preoperatively.  Scope verified using 2 person system.

## 2024-04-12 NOTE — PROGRESS NOTES
Discharge instructions review with patient and mother Trace. Pt discharged via wheelchair. Pt discharged with all belongings. Trace taking stable pt home.

## 2024-04-12 NOTE — PROGRESS NOTES
Did report blood glucose of 287 to Dr Oseguera anesthesia. Did receive order for 5U of regular insulin, also to inform Dr Carreon

## 2024-04-12 NOTE — PROGRESS NOTES
Pt awake and alert at this time. Pt on RA, and VSS. Pt denies pain and nausea, tolerating PO. Pt meets criteria to be discharged from Phase 1.

## 2024-04-12 NOTE — ANESTHESIA PRE PROCEDURE
Department of Anesthesiology  Preprocedure Note       Name:  Joe Khan   Age:  18 y.o.  :  2005                                          MRN:  [id number]         Date:  2024      Surgeon: Surgeon(s):  Prince Harris MD    Procedure: Procedure(s):  ESOPHAGOGASTRODUODENOSCOPY    Medications prior to admission:   Prior to Admission medications    Medication Sig Start Date End Date Taking? Authorizing Provider   metFORMIN (GLUCOPHAGE-XR) 500 MG extended release tablet Take 1 tablet by mouth 22   Mejia Cerda MD   blood glucose test strips (TRUE METRIX BLOOD GLUCOSE TEST) strip 1 each daily 22   Mejia Cerda MD   blood glucose test strips (ONETOUCH VERIO) strip 1 each 22   Mejia Cerda MD   Continuous Blood Gluc Sensor (DEXCOM G7 SENSOR) MIS APPLY 1 NEW SENSOR TOPICALLY EVERY 10 DAYS 24   Mejia Cerda MD   Glucagon (BAQSIMI ONE PACK) 3 MG/DOSE POWD Use as directed for treatment of severe low BG/unable to eat drink/unconscious 22   Mejia Cerda MD   atorvastatin (LIPITOR) 20 MG tablet Take 1 tablet by mouth daily 23   Mejia Cerda MD   TRULICITY 0.75 MG/0.5ML SOPN INJECT 3/4 (THREE-FOURTHS) MG SUBCUTANEOUSLY  ONCE A WEEK 24   Mejia Cerda MD   LANTUS SOLOSTAR 100 UNIT/ML injection pen INJECT 60 UNITS SUBCUTANEOUSLY NIGHTLY    Mejia Cerda MD   HUMALOG KWIKPEN 200 UNIT/ML SOPN pen GIVE 12-20 UNITS SUBQ THREE TIMES A DAY PER SLIDING SCALE    Mejia Cerda MD   insulin lispro, 1 Unit Dial, (HUMALOG/ADMELOG) 100 UNIT/ML SOPN Variable dose - up to 100 unit(s) per day - used multiple times daily for bolus and correction 22   Mejia Cerda MD   lisinopril (PRINIVIL;ZESTRIL) 5 MG tablet 1qd 22   Mejia Cerda MD       Current medications:    Current Facility-Administered Medications   Medication Dose Route Frequency Provider Last Rate Last Admin   • sodium chloride flush

## 2024-04-12 NOTE — H&P
Department of General Surgery - Adult Surgical Service   [facility]   [employer]  Attending Pre-operative History and Physical      DIAGNOSIS:  Obesity    INDICATION:  Pre-op    PROCEDURE:  EGD    CHIEF COMPLAINT:  Obesity    History Obtained From:  patient    HISTORY OF PRESENT ILLNESS:    The patient is a 18 y.o. female with significant past medical history of   Patient Active Problem List   Diagnosis    Type 2 diabetes mellitus with obesity (HCC)    Morbid obesity with BMI of 50.0-59.9, adult (HCC)    Chronic GERD    García-Danlos syndrome      who presents for pre-op EGD    Past Medical History:        Diagnosis Date    Anxiety     Diabetes mellitus type 2 in obese 01/25/2024    EDS (García-Danlos syndrome)     Urinary incontinence      Past Surgical History:        Procedure Laterality Date    TONSILLECTOMY AND ADENOIDECTOMY       Medications Prior to Admission:   Medications Prior to Admission: metFORMIN (GLUCOPHAGE-XR) 500 MG extended release tablet, Take 1 tablet by mouth  blood glucose test strips (TRUE METRIX BLOOD GLUCOSE TEST) strip, 1 each daily  blood glucose test strips (ONETOUCH VERIO) strip, 1 each  Continuous Blood Gluc Sensor (DEXCOM G7 SENSOR) MIS, APPLY 1 NEW SENSOR TOPICALLY EVERY 10 DAYS  Glucagon (BAQSIMI ONE PACK) 3 MG/DOSE POWD, Use as directed for treatment of severe low BG/unable to eat drink/unconscious  atorvastatin (LIPITOR) 20 MG tablet, Take 1 tablet by mouth daily  TRULICITY 0.75 MG/0.5ML SOPN, INJECT 3/4 (THREE-FOURTHS) MG SUBCUTANEOUSLY  ONCE A WEEK  LANTUS SOLOSTAR 100 UNIT/ML injection pen, INJECT 60 UNITS SUBCUTANEOUSLY NIGHTLY  HUMALOG KWIKPEN 200 UNIT/ML SOPN pen, GIVE 12-20 UNITS SUBQ THREE TIMES A DAY PER SLIDING SCALE  insulin lispro, 1 Unit Dial, (HUMALOG/ADMELOG) 100 UNIT/ML SOPN, Variable dose - up to 100 unit(s) per day - used multiple times daily for bolus and correction  lisinopril (PRINIVIL;ZESTRIL) 5 MG tablet, 1qd    Allergies:   normal range of motion. No JVD present.   Pulmonary/Chest: Effort normal. No accessory muscle usage or stridor. No apnea. No respiratory distress.   Cardiovascular: Normal rate and no JVD.   Abdominal: Normal appearance. Patient exhibits no distension.   Musculoskeletal: Normal range of motion. Patient exhibits no edema.   Neurological: Patient is alert and oriented to person, place, and time. Patient has normal strength. GCS eye subscore is 4. GCS verbal subscore is 5. GCS motor subscore is 6.   Skin: Skin is warm and dry. No abrasion and no rash noted. Patient is not diaphoretic. No cyanosis or erythema.   Psychiatric: Patient has a normal mood and affect. Speech is normal and behavior is normal. Cognition and memory are normal.       DATA:  CBC:   Lab Results   Component Value Date/Time    WBC 8.8 04/08/2021 11:15 PM    RBC 4.83 04/08/2021 11:15 PM    HGB 13.2 04/08/2021 11:15 PM    HCT 39.6 04/08/2021 11:15 PM    MCV 81.9 04/08/2021 11:15 PM    MCH 27.4 04/08/2021 11:15 PM    MCHC 33.4 04/08/2021 11:15 PM    RDW 15.2 04/08/2021 11:15 PM     04/08/2021 11:15 PM    MPV 8.4 04/08/2021 11:15 PM     CMP:    Lab Results   Component Value Date/Time     04/08/2021 11:15 PM    K 3.7 04/08/2021 11:15 PM    CL 94 04/08/2021 11:15 PM    CO2 20 04/08/2021 11:15 PM    BUN 8 04/08/2021 11:15 PM    CREATININE 0.7 04/08/2021 11:15 PM    GFRAA >60 04/08/2021 11:15 PM    AGRATIO 1.4 04/08/2021 11:15 PM    LABGLOM >60 04/08/2021 11:15 PM    GLUCOSE 572 04/08/2021 11:15 PM    PROT 7.2 04/08/2021 11:15 PM    LABALBU 4.2 04/08/2021 11:15 PM    CALCIUM 9.6 04/08/2021 11:15 PM    BILITOT <0.2 04/08/2021 11:15 PM    ALKPHOS 100 04/08/2021 11:15 PM    AST 25 04/08/2021 11:15 PM    ALT 25 04/08/2021 11:15 PM       ASSESSMENT AND PLAN:      Obesity: Body mass index is 53.07 kg/m².  [x] Continue to make dietary and lifestyle modifications.  [x] Plan for Future laparoscopic sleeve gastrectomy.  [x] Return for

## 2024-04-14 LAB
A-TOCOPHEROL VIT E SERPL-MCNC: 8.5 MG/L (ref 5.5–18)
ANNOTATION COMMENT IMP: NORMAL
BETA+GAMMA TOCOPHEROL SERPL-MCNC: 2.4 MG/L (ref 0–6)
RETINYL PALMITATE SERPL-MCNC: <0.02 MG/L (ref 0–0.1)
VIT A SERPL-MCNC: 0.41 MG/L (ref 0.3–1.2)

## 2024-04-16 LAB — VIT B1 BLD-MCNC: 174 NMOL/L (ref 70–180)

## 2024-05-23 ENCOUNTER — OFFICE VISIT (OUTPATIENT)
Dept: BARIATRICS/WEIGHT MGMT | Age: 19
End: 2024-05-23
Payer: COMMERCIAL

## 2024-05-23 VITALS
SYSTOLIC BLOOD PRESSURE: 128 MMHG | OXYGEN SATURATION: 98 % | BODY MASS INDEX: 50.02 KG/M2 | HEIGHT: 64 IN | WEIGHT: 293 LBS | DIASTOLIC BLOOD PRESSURE: 76 MMHG | RESPIRATION RATE: 18 BRPM | HEART RATE: 102 BPM

## 2024-05-23 DIAGNOSIS — E11.69 TYPE 2 DIABETES MELLITUS WITH OBESITY (HCC): ICD-10-CM

## 2024-05-23 DIAGNOSIS — K21.9 CHRONIC GERD: ICD-10-CM

## 2024-05-23 DIAGNOSIS — E66.9 TYPE 2 DIABETES MELLITUS WITH OBESITY (HCC): ICD-10-CM

## 2024-05-23 DIAGNOSIS — E66.01 MORBID OBESITY WITH BMI OF 50.0-59.9, ADULT (HCC): Primary | ICD-10-CM

## 2024-05-23 PROCEDURE — 3046F HEMOGLOBIN A1C LEVEL >9.0%: CPT | Performed by: SURGERY

## 2024-05-23 PROCEDURE — G8427 DOCREV CUR MEDS BY ELIG CLIN: HCPCS | Performed by: SURGERY

## 2024-05-23 PROCEDURE — 2022F DILAT RTA XM EVC RTNOPTHY: CPT | Performed by: SURGERY

## 2024-05-23 PROCEDURE — G8417 CALC BMI ABV UP PARAM F/U: HCPCS | Performed by: SURGERY

## 2024-05-23 PROCEDURE — 99214 OFFICE O/P EST MOD 30 MIN: CPT | Performed by: SURGERY

## 2024-05-23 PROCEDURE — 1036F TOBACCO NON-USER: CPT | Performed by: SURGERY

## 2024-05-23 NOTE — PROGRESS NOTES
Joe Khan lost 6 lbs over past ~month.  Down 22.2 lb total.    Is pt eating at least 4 times everyday?  yes    B- alcaraz OR turkey OR sausage w/ toast or croissant or biscuit  S- banana OR yogurt  L- protein w/ fruit & vegetable  D- grilled chicken w/ vegetables & sometimes a carb like (mashed potatoes or mac n' cheese)    Is pt eating a lean protein source with all meals and snacks?  generally    Has pt decreased their portions using the plate method?  yes    Is pt choosing low fat/sugar free options?  discussed- margo fat meats, biscuits, croissants & mac n' cheese    Is pt drinking at least 64 oz of clear liquids everyday? yes - water / powerade zero    Has pt stopped drinking carbonation, caffeinated, and sugar sweetened beverages?  1 mini diet coke    Has pt sampled Unjury and/or Nectar protein? yes - tried & tolerated    Has pt attended a support group? Scheduled for today    Participating in intentional exercise? yes - walking 3x/wk for ~15-20 min    Plan/Recommendations:   - Avoid/limit high fat meats, mac n' cheese & crossiants/biscuits  - Eliminate diet soda    Handouts: none    Apurva Green, RD, LD

## 2024-05-23 NOTE — PROGRESS NOTES
[facility]   [employer]  Prince Harris MD, FACS, [address], [address]    [facility] [address] .  Phone: [phone]  Fax: [phone]        HPI:     Joe Khan is a very pleasant 19 y.o. female with Body mass index is 52.7 kg/m²., Pre-Surgery for future weight loss.     Pre-operative clearance and work up done. Working hard to keep good dietary habits as well level of activity.  Patient denies any nausea, vomiting, fevers, chills, shortness of breath, chest pain, cough, constipation or difficulty urinating.    Pain Assessment   Denies any abdominal pain       Past Medical History:   Diagnosis Date    Anxiety     Diabetes mellitus type 2 in obese 01/25/2024    EDS (García-Danlos syndrome)     Urinary incontinence      Past Surgical History:   Procedure Laterality Date    TONSILLECTOMY AND ADENOIDECTOMY      UPPER GASTROINTESTINAL ENDOSCOPY N/A 4/12/2024    ESOPHAGOGASTRODUODENOSCOPY BIOPSY performed by Prince Harris MD at [facility] ENDOSCOPY     Family History   Problem Relation Age of Onset    Elevated Lipids Mother     Diabetes Mother     Hypertension Father     Elevated Lipids Father     Mental Illness Father     Heart Disease Maternal Grandmother     Heart Disease Maternal Grandfather      Social History     Tobacco Use    Smoking status: Never    Smokeless tobacco: Never   Substance Use Topics    Alcohol use: No     I counseled the patient on the importance of not smoking and risks of ETOH.   No Known Allergies  Vitals:    05/23/24 1009   BP: 128/76   Pulse: (!) 102   Resp: 18   SpO2: 98%   Weight: (!) 139.3 kg (307 lb)   Height: 1.626 m (5' 4\")       Body mass index is 52.7 kg/m².      Current Outpatient Medications:     omeprazole (PRILOSEC) 20 MG delayed release capsule, Take 1 capsule by mouth every morning (before breakfast), Disp: 90 capsule, Rfl: 1    metFORMIN (GLUCOPHAGE-XR) 500 MG extended release tablet, Take 1 tablet by mouth, Disp: , Rfl:

## 2024-05-28 PROBLEM — E78.2 MIXED HYPERLIPIDEMIA: Status: ACTIVE | Noted: 2024-05-28

## 2024-05-28 NOTE — PROGRESS NOTES
postoperatively.  [x] Continue follow up with their PCP and/or endocrinologist for medication adjustments (Humalog, Lantus, Trulicity & Metformin XR) and/or discontinuation as needed.  [x] Reviewed that they will be required to crush or open their medications for the first two weeks after surgery and reviewed those medications that can not be crushed.    Hyperlipidemia:   [x] Continue to make dietary and lifestyle modifications per our recommendations.   [x] Weight loss recommended. Discussed the benefits of weight loss and dietary changes on lipids and cholesterol.  [x] Continue follow up with their PCP for medication adjustments (Lipitor) and/or discontinuation as needed.  [x] Reviewed that they will be required to crush or open their medications for the first two weeks after surgery and reviewed those medications that can not be crushed.    Chronic GERD:   [x] Continue to make dietary and lifestyle modifications per our recommendations.  [x] Weight loss recommended.  [x] Continue PPI (Prilosec).  [] Continue H2 Blocker  [] Patient is not currently taking a PPI/H2 Blocker so we will start one after surgery for a short term basis as they work through the phases of the postoperative diet.  [x] Reviewed that they will be required to crush their H2 Blocker for the first two weeks after surgery. If they are taking a PPI they will take it whole as they are delayed release capsules or tablets.    Obesity:  [x] Continue to make dietary and lifestyle modifications per our recommendations.  [x] Weight loss recommended.  [x] Patient scheduled for laparoscopic sleeve gastrectomy for future weight loss.    Total encounter time: 31 minutes, including any number of the following: Bariatric Preoperative work up/protocols, review of labs, provider notes, outside hospital records, performing examination/evaluation, counseling patient and/or family, coordination of care; discussing dietary plan with the patient and documentation in

## 2024-05-29 ENCOUNTER — OFFICE VISIT (OUTPATIENT)
Dept: ENDOCRINOLOGY | Age: 19
End: 2024-05-29
Payer: COMMERCIAL

## 2024-05-29 VITALS
HEIGHT: 64 IN | BODY MASS INDEX: 50.02 KG/M2 | WEIGHT: 293 LBS | SYSTOLIC BLOOD PRESSURE: 161 MMHG | DIASTOLIC BLOOD PRESSURE: 94 MMHG | HEART RATE: 114 BPM

## 2024-05-29 DIAGNOSIS — E78.2 MIXED HYPERLIPIDEMIA: ICD-10-CM

## 2024-05-29 DIAGNOSIS — E66.01 MORBID OBESITY WITH BMI OF 50.0-59.9, ADULT (HCC): ICD-10-CM

## 2024-05-29 DIAGNOSIS — E11.69 TYPE 2 DIABETES MELLITUS WITH OBESITY (HCC): Primary | ICD-10-CM

## 2024-05-29 DIAGNOSIS — E66.9 TYPE 2 DIABETES MELLITUS WITH OBESITY (HCC): Primary | ICD-10-CM

## 2024-05-29 PROCEDURE — G8427 DOCREV CUR MEDS BY ELIG CLIN: HCPCS | Performed by: INTERNAL MEDICINE

## 2024-05-29 PROCEDURE — 99204 OFFICE O/P NEW MOD 45 MIN: CPT | Performed by: INTERNAL MEDICINE

## 2024-05-29 PROCEDURE — 1036F TOBACCO NON-USER: CPT | Performed by: INTERNAL MEDICINE

## 2024-05-29 PROCEDURE — 2022F DILAT RTA XM EVC RTNOPTHY: CPT | Performed by: INTERNAL MEDICINE

## 2024-05-29 PROCEDURE — G8417 CALC BMI ABV UP PARAM F/U: HCPCS | Performed by: INTERNAL MEDICINE

## 2024-05-29 PROCEDURE — 95251 CONT GLUC MNTR ANALYSIS I&R: CPT | Performed by: INTERNAL MEDICINE

## 2024-05-29 PROCEDURE — 3046F HEMOGLOBIN A1C LEVEL >9.0%: CPT | Performed by: INTERNAL MEDICINE

## 2024-05-29 RX ORDER — INSULIN LISPRO 200 [IU]/ML
INJECTION, SOLUTION SUBCUTANEOUS
Qty: 4 ADJUSTABLE DOSE PRE-FILLED PEN SYRINGE | Refills: 2 | Status: SHIPPED | OUTPATIENT
Start: 2024-05-29

## 2024-05-29 RX ORDER — ACYCLOVIR 400 MG/1
TABLET ORAL
Qty: 6 EACH | Refills: 2 | Status: SHIPPED | OUTPATIENT
Start: 2024-05-29

## 2024-05-29 RX ORDER — INSULIN GLARGINE 300 U/ML
60 INJECTION, SOLUTION SUBCUTANEOUS EVERY MORNING
Qty: 6 ML | Refills: 1 | Status: SHIPPED | OUTPATIENT
Start: 2024-05-29

## 2024-05-29 RX ORDER — TIRZEPATIDE 2.5 MG/.5ML
2.5 INJECTION, SOLUTION SUBCUTANEOUS WEEKLY
Qty: 2 ML | Refills: 1 | Status: SHIPPED | OUTPATIENT
Start: 2024-05-29

## 2024-05-29 NOTE — PATIENT INSTRUCTIONS
your blood sugar and learn how it changes after exercise. If your blood sugar stays at a normal level, you may not need to eat after you exercise.  Limit how much alcohol you drink. Alcohol can make low blood sugar go even lower. Don't drink alcohol if you have problems recognizing the early signs of low blood sugar.  Keep a diary of your symptoms. This helps you learn when changes in your body may signal low blood sugar. And keep track of how often you have low blood sugar, including when you last ate and what you ate. This will help you learn what causes your blood sugar to drop.  Learn about diabetes and low blood sugar. Support groups or a diabetes education center can help you understand how medicines, diet, and exercise affect your blood sugar levels.  Since low blood sugar levels can quickly become an emergency, be sure to wear medical alert jewelry, such as a medical alert bracelet. This is to let people know you have diabetes so they can get help for you. You can buy this at most drugstores. And make sure your family, friends, and coworkers know the symptoms of low blood sugar. Teach them what to do to get your sugar level up.  Follow-up care is a key part of your treatment and safety. Be sure to make and go to all appointments, and call your doctor if you are having problems. It's also a good idea to know your test results and keep a list of the medicines you take.  Where can you learn more?  Go to https://www.[employer].net/patientEd and enter T511 to learn more about \"Learning About Low Blood Sugar (Hypoglycemia) in Diabetes.\"  Current as of: October 2, 2023               Content Version: 14.0  © 2006-2024 [employer].   Care instructions adapted under license by [employer] (formerly [employer]). If you have questions about a medical condition or this instruction, always ask your healthcare professional. [employer] disclaims any warranty or liability for your use of this information.

## 2024-05-29 NOTE — PROGRESS NOTES
Toujeo U300, take 60 units every morning.  Ensure consistent intake.  May titrate up by 2 units every 2 days until fasting blood sugars are below 150.  Change Humalog U200 to 10 units before each meal with sliding scale as below  Correction scale:                    With meals (during the day)   At bedtime    < 150 ---     0 units                                 0 units  151-200:    2 units                                 0 units  201-250:    4 units                                 2 units  251-300:    6 units                                 4 units  301-350:    8 units                                 6 units  >350:         10 units                               8 units    Continues with Dexcom G7.  Review blood sugars in 1 month to make further adjustments prior to surgery.  Advised patient to reach out to the clinic in case of any severe hyper or hypoglycemia.    The patient remains at ongoing is at high risk for complications related to uncontrolled diabetes and it's treatment. The patient requires a great deal of self-management support. I expect the patient's risk to be reduced with the changes to the treatment plan that I recommended today.   -     insulin glargine, 1 unit dial, (TOUJEO SOLOSTAR) 300 UNIT/ML concentrated injection pen; Inject 60 Units into the skin every morning, Disp-6 mL, R-1Normal  -     Tirzepatide (MOUNJARO) 2.5 MG/0.5ML SOPN SC injection; Inject 0.5 mLs into the skin once a week, Disp-2 mL, R-1Normal  -     HUMALOG KWIKPEN 200 UNIT/ML SOPN pen; Take 10 units TID AC Correction scale:                 With meals (during the day)   At bedtime  < 150 ---     0 units                                 0 units 151-200:    2 units                                 0 units 201-250:    4 units                                 2 units 251-300:    6 units                                 4 units 301-350:    8 units                                 6 units >350:     10 units                               8

## 2024-05-30 ENCOUNTER — TELEPHONE (OUTPATIENT)
Dept: BARIATRICS/WEIGHT MGMT | Age: 19
End: 2024-05-30

## 2024-05-30 NOTE — TELEPHONE ENCOUNTER
BC/BS approval for sleeve submitted   CPT 91592  DOS 7/22/24  This will come back as an exclusion.    Will need to file [employer] with denial.  Needs drug, alcohol, nicotine and pregnancy testing done.  6/18 Pre op will be 6th visit

## 2024-06-11 NOTE — PROGRESS NOTES
Patient Name:   Joe Khan      Type of Surgery:  Sleeve         Date of Surgery:  7/22/24       Start Pre-Op Diet On:  7/9/24        Start Clear Liquids On:  7/21/24

## 2024-06-12 ENCOUNTER — TELEPHONE (OUTPATIENT)
Dept: BARIATRICS/WEIGHT MGMT | Age: 19
End: 2024-06-12

## 2024-06-12 NOTE — TELEPHONE ENCOUNTER
LVM reminding patient to get Drug, Alcohol, Nicotine and Pregnancy testing done so we can file with [employer] after pre op class next Tuesday.  Orders in epic

## 2024-06-18 ENCOUNTER — HOSPITAL ENCOUNTER (OUTPATIENT)
Age: 19
Discharge: HOME | End: 2024-06-18
Payer: COMMERCIAL

## 2024-06-18 ENCOUNTER — HOSPITAL ENCOUNTER (OUTPATIENT)
Age: 19
Setting detail: SPECIMEN
Discharge: HOME | End: 2024-06-18

## 2024-06-18 ENCOUNTER — OFFICE VISIT (OUTPATIENT)
Dept: BARIATRICS/WEIGHT MGMT | Age: 19
End: 2024-06-18
Payer: COMMERCIAL

## 2024-06-18 VITALS — WEIGHT: 293 LBS | BODY MASS INDEX: 50.02 KG/M2 | HEIGHT: 64 IN

## 2024-06-18 DIAGNOSIS — K21.9 CHRONIC GERD: Primary | ICD-10-CM

## 2024-06-18 DIAGNOSIS — E78.2 MIXED HYPERLIPIDEMIA: ICD-10-CM

## 2024-06-18 DIAGNOSIS — E66.9 TYPE 2 DIABETES MELLITUS WITH OBESITY (HCC): ICD-10-CM

## 2024-06-18 DIAGNOSIS — E66.01 MORBID OBESITY WITH BMI OF 50.0-59.9, ADULT (HCC): ICD-10-CM

## 2024-06-18 DIAGNOSIS — K21.9 CHRONIC GERD: ICD-10-CM

## 2024-06-18 DIAGNOSIS — E11.69 TYPE 2 DIABETES MELLITUS WITH OBESITY (HCC): ICD-10-CM

## 2024-06-18 LAB
AMPHETAMINES UR QL SCN>1000 NG/ML: NORMAL
BARBITURATES UR QL SCN>200 NG/ML: NORMAL
BENZODIAZ UR QL SCN>200 NG/ML: NORMAL
CANNABINOIDS UR QL SCN>50 NG/ML: NORMAL
COCAINE UR QL SCN: NORMAL
DRUG SCREEN COMMENT UR-IMP: NORMAL
ETHANOLAMINE SERPL-MCNC: NORMAL MG/DL (ref 0–0.08)
FENTANYL SCREEN, URINE: NORMAL
HCG UR QL: NEGATIVE
METHADONE UR QL SCN>300 NG/ML: NORMAL
OPIATES UR QL SCN>300 NG/ML: NORMAL
OXYCODONE UR QL SCN: NORMAL
PCP UR QL SCN>25 NG/ML: NORMAL
PH UR STRIP: 5.5 [PH]

## 2024-06-18 PROCEDURE — G8417 CALC BMI ABV UP PARAM F/U: HCPCS | Performed by: NURSE PRACTITIONER

## 2024-06-18 PROCEDURE — 82077 ASSAY SPEC XCP UR&BREATH IA: CPT

## 2024-06-18 PROCEDURE — 36415 COLL VENOUS BLD VENIPUNCTURE: CPT

## 2024-06-18 PROCEDURE — 99214 OFFICE O/P EST MOD 30 MIN: CPT | Performed by: NURSE PRACTITIONER

## 2024-06-18 PROCEDURE — 84703 CHORIONIC GONADOTROPIN ASSAY: CPT

## 2024-06-18 PROCEDURE — G0480 DRUG TEST DEF 1-7 CLASSES: HCPCS

## 2024-06-18 PROCEDURE — G8427 DOCREV CUR MEDS BY ELIG CLIN: HCPCS | Performed by: NURSE PRACTITIONER

## 2024-06-18 PROCEDURE — 3046F HEMOGLOBIN A1C LEVEL >9.0%: CPT | Performed by: NURSE PRACTITIONER

## 2024-06-18 PROCEDURE — 1036F TOBACCO NON-USER: CPT | Performed by: NURSE PRACTITIONER

## 2024-06-18 PROCEDURE — 2022F DILAT RTA XM EVC RTNOPTHY: CPT | Performed by: NURSE PRACTITIONER

## 2024-06-18 PROCEDURE — 80307 DRUG TEST PRSMV CHEM ANLYZR: CPT

## 2024-06-21 LAB
COTININE SERPL-MCNC: <5 NG/ML
NICOTINE SERPL-MCNC: <5 NG/ML

## 2024-06-25 ENCOUNTER — TELEPHONE (OUTPATIENT)
Dept: BARIATRICS/WEIGHT MGMT | Age: 19
End: 2024-06-25

## 2024-06-28 ENCOUNTER — PREP FOR PROCEDURE (OUTPATIENT)
Dept: BARIATRICS/WEIGHT MGMT | Age: 19
End: 2024-06-28

## 2024-06-28 DIAGNOSIS — E66.01 MORBID OBESITY (HCC): Primary | ICD-10-CM

## 2024-07-01 ENCOUNTER — TELEPHONE (OUTPATIENT)
Dept: ENDOCRINOLOGY | Age: 19
End: 2024-07-01

## 2024-07-01 ENCOUNTER — TELEPHONE (OUTPATIENT)
Dept: BARIATRICS/WEIGHT MGMT | Age: 19
End: 2024-07-01

## 2024-07-01 RX ORDER — ENOXAPARIN SODIUM 100 MG/ML
40 INJECTION SUBCUTANEOUS ONCE
Status: CANCELLED | OUTPATIENT
Start: 2024-07-01 | End: 2024-07-01

## 2024-07-01 RX ORDER — SCOLOPAMINE TRANSDERMAL SYSTEM 1 MG/1
1 PATCH, EXTENDED RELEASE TRANSDERMAL ONCE
Status: CANCELLED | OUTPATIENT
Start: 2024-07-01 | End: 2024-07-01

## 2024-07-01 RX ORDER — ACETAMINOPHEN 160 MG/5ML
650 LIQUID ORAL ONCE
Status: CANCELLED | OUTPATIENT
Start: 2024-07-01 | End: 2024-07-01

## 2024-07-01 RX ORDER — SODIUM CHLORIDE 0.9 % (FLUSH) 0.9 %
5-40 SYRINGE (ML) INJECTION PRN
Status: CANCELLED | OUTPATIENT
Start: 2024-07-01

## 2024-07-01 RX ORDER — SODIUM CHLORIDE 0.9 % (FLUSH) 0.9 %
5-40 SYRINGE (ML) INJECTION EVERY 12 HOURS SCHEDULED
Status: CANCELLED | OUTPATIENT
Start: 2024-07-01

## 2024-07-01 RX ORDER — SODIUM CHLORIDE 9 MG/ML
INJECTION, SOLUTION INTRAVENOUS PRN
Status: CANCELLED | OUTPATIENT
Start: 2024-07-01

## 2024-07-01 RX ORDER — SODIUM CHLORIDE, SODIUM LACTATE, POTASSIUM CHLORIDE, CALCIUM CHLORIDE 600; 310; 30; 20 MG/100ML; MG/100ML; MG/100ML; MG/100ML
INJECTION, SOLUTION INTRAVENOUS CONTINUOUS
Status: CANCELLED | OUTPATIENT
Start: 2024-07-01

## 2024-07-01 NOTE — TELEPHONE ENCOUNTER
Pt mom called stating rx need PA to get refill        Tirzepatide (MOUNJARO) 2.5 MG/0.5ML SOPN SC injection       [facility] - [place], [place] - [address] - P [phone] - F [phone] [address] [address]  Phone: [phone]  Fax: [phone]

## 2024-07-01 NOTE — TELEPHONE ENCOUNTER
Sherly Rizo sent me a message this morning that this patient's HgbA1c on 4/11/2024 was 12. We need to recheck her A1c to make sure it is now below 10 in order for her to have surgery on 7/22/2024. I have placed the order and if you could call her an make sure she is fasting for this and to get it done as soon as possible.  Thank you,  KISHOR GambleC

## 2024-07-02 DIAGNOSIS — E66.9 TYPE 2 DIABETES MELLITUS WITH OBESITY (HCC): Primary | ICD-10-CM

## 2024-07-02 DIAGNOSIS — E66.01 MORBID OBESITY WITH BMI OF 50.0-59.9, ADULT (HCC): ICD-10-CM

## 2024-07-02 DIAGNOSIS — E11.69 TYPE 2 DIABETES MELLITUS WITH OBESITY (HCC): Primary | ICD-10-CM

## 2024-07-02 NOTE — TELEPHONE ENCOUNTER
[facility] approval # [id number]   CPT 99542  DOS 8/19  Auth valid  7/22/24-10/22/24   outpt  23hr

## 2024-07-02 NOTE — TELEPHONE ENCOUNTER
Surgery rescheduled to 8/19 due to work PTO.  New diet start date 8/6 with clears on 8/18, NPO after midnight.  Patient verbalized understanding.  Patient will R/S H&P to be within 30 days prior surgery.  Advised PAT will call with arrival time and schedule labs.  Also advised A1C needing rechecked.  She will have this done mid July

## 2024-07-02 NOTE — TELEPHONE ENCOUNTER
Submitted PA for Mounjaro 2.5MG/0.5ML pen-injectors   Via [facility] [id number]  STATUS: PENDING.    Follow up done daily; if no decision with in three days we will refax.  If another three days goes by with no decision will call the insurance for status.

## 2024-07-03 NOTE — TELEPHONE ENCOUNTER
The medication is APPROVED.    Outcome  Approved on July 2  Request Reference Number: [id number]. MOUNJARO INJ 2.5/0.5 is approved through 07/02/2025. Your patient may now fill this prescription and it will be covered.  Authorization Expiration Date: 7/2/2025    If this requires a response please respond to the pool ( P MHCX PSC MEDICATION PRE-AUTH).      Thank you please advise patient.

## 2024-07-19 ENCOUNTER — HOSPITAL ENCOUNTER (OUTPATIENT)
Age: 19
Discharge: HOME | End: 2024-07-19

## 2024-07-19 DIAGNOSIS — E66.01 MORBID OBESITY WITH BMI OF 50.0-59.9, ADULT (HCC): ICD-10-CM

## 2024-07-19 DIAGNOSIS — E11.69 TYPE 2 DIABETES MELLITUS WITH OBESITY (HCC): ICD-10-CM

## 2024-07-19 DIAGNOSIS — E66.9 TYPE 2 DIABETES MELLITUS WITH OBESITY (HCC): ICD-10-CM

## 2024-07-19 LAB
EST. AVERAGE GLUCOSE BLD GHB EST-MCNC: 306.3 MG/DL
HBA1C MFR BLD: 12.3 %

## 2024-08-27 DIAGNOSIS — E66.9 TYPE 2 DIABETES MELLITUS WITH OBESITY (HCC): ICD-10-CM

## 2024-08-27 DIAGNOSIS — E11.69 TYPE 2 DIABETES MELLITUS WITH OBESITY (HCC): ICD-10-CM

## 2024-08-27 RX ORDER — TIRZEPATIDE 2.5 MG/.5ML
INJECTION, SOLUTION SUBCUTANEOUS
Qty: 4 ML | Refills: 0 | OUTPATIENT
Start: 2024-08-27

## 2024-08-30 ENCOUNTER — OFFICE VISIT (OUTPATIENT)
Dept: ENDOCRINOLOGY | Age: 19
End: 2024-08-30

## 2024-08-30 VITALS — HEIGHT: 64 IN | BODY MASS INDEX: 50.02 KG/M2 | WEIGHT: 293 LBS

## 2024-08-30 DIAGNOSIS — E78.2 MIXED HYPERLIPIDEMIA: ICD-10-CM

## 2024-08-30 DIAGNOSIS — E11.69 TYPE 2 DIABETES MELLITUS WITH OBESITY (HCC): Primary | ICD-10-CM

## 2024-08-30 DIAGNOSIS — E66.9 TYPE 2 DIABETES MELLITUS WITH OBESITY (HCC): Primary | ICD-10-CM

## 2024-08-30 LAB — HBA1C MFR BLD: 9.7 %

## 2024-08-30 NOTE — PROGRESS NOTES
[facility] Endocrinology    Chief Complaint:     Chief Complaint   Patient presents with    Diabetes    Follow-up       Subjective:   Joe Khan is a pleasant 19 y.o. female who presents for follow up of Diabetes Mellitus type 2. Patient also has hyperlipidemia, morbid obesity with BMI of 53, García-Danlos syndrome, chronic GERD.    Diagnosed: April 2021, had osmotic symptoms. A1c was 10.6% with c-peptide 3.6.  She was at that time evaluated at [facility].  Initial medications: MDI with humalog and lantus. Metformin caused GI upset. She was on Trulicity but noticed weight gain instead. Victoza caused upset stomach.   Hx of severe hypoglycemia or DKA: none.   Hx of MI/stroke/CKD: none   Hx of pancreatitis: none   Family hx of thyroid cancer: none     She was last seen in May 2024.  At that time she was on Lantus 60 units at bedtime as well as Humalog 10 units 3 times daily with sliding scale.  She was advised to start Mounjaro to lower insulin needs and improve blood sugars.  Subsequently, she ran out of her insulin and stopped its usage.  She also stopped using Dexcom.  A1c checked in July 2024 was elevated at 12.3% therefore her bariatric surgery was canceled.  She was advised that it needs to be below 10% to be able to proceed with bariatric surgery.    She was able to start on Mounjaro in July 2024.  She had received about 6 doses.  She reports improvement in the way she feels.  He did put back on sensor only today with readings in the 150s to 170 range.  She reports that she had felt improvement in symptoms after starting Mounjaro that she is considering to hold off on bariatric surgery.    Most recent HbA1c:   Hemoglobin A1C   Date Value Ref Range Status   08/30/2024 9.7 % Final      Current regimen:  Mounjaro 2.5 mg weekly    Blood sugar ranges: She used Dexcom G7.  Limited information from today.  Hypoglycemic episodes: None  Hypoglycemia treatment: skittles     Meals: 3 meals and two  Glom Filt Rate 04/11/2024 >90  >60 Final    Calcium 04/11/2024 9.5  8.3 - 10.6 mg/dL Final    Total Protein 04/11/2024 7.4  6.4 - 8.2 g/dL Final    Albumin 04/11/2024 4.4  3.4 - 5.0 g/dL Final    Albumin/Globulin Ratio 04/11/2024 1.5  1.1 - 2.2 Final    Total Bilirubin 04/11/2024 0.3  0.0 - 1.0 mg/dL Final    Alkaline Phosphatase 04/11/2024 70  40 - 129 U/L Final    ALT 04/11/2024 23  10 - 40 U/L Final    AST 04/11/2024 21  15 - 37 U/L Final    Hemoglobin A1C 04/11/2024 12.0  See comment % Final    Estimated Avg Glucose 04/11/2024 297.7  mg/dL Final    Iron 04/11/2024 53  37 - 145 ug/dL Final    TIBC 04/11/2024 328  260 - 445 ug/dL Final    Iron % Saturation 04/11/2024 16  15 - 50 % Final    Cholesterol, Total 04/11/2024 203 (H)  0 - 199 mg/dL Final    Triglycerides 04/11/2024 163 (H)  0 - 150 mg/dL Final    HDL 04/11/2024 31 (L)  40 - 60 mg/dL Final    LDL Calculated 04/11/2024 139 (H)  <100 mg/dL Final    VLDL Cholesterol Calculated 04/11/2024 33  Not Established mg/dL Final    TSH 04/11/2024 1.89  0.43 - 4.00 uIU/mL Final    Vitamin A 04/11/2024 0.41  0.30 - 1.20 mg/L Final    Vitamin A, Interp 04/11/2024 Normal   Final    RETINYL PALMITATE 04/11/2024 <0.02  0.00 - 0.10 mg/L Final    Vitamin B1,Whole Blood 04/11/2024 174  70 - 180 nmol/L Final    Vitamin B-12 04/11/2024 903  211 - 911 pg/mL Final    Folate 04/11/2024 8.71  4.78 - 24.20 ng/mL Final    Vit D, 25-Hydroxy 04/11/2024 28.7 (L)  >=30 ng/mL Final    Alpha-Tocopherol 04/11/2024 8.5  5.5 - 18.0 mg/L Final    Gamma-Tocopherol 04/11/2024 2.4  0.0 - 6.0 mg/L Final           Assessment and Plan     1. Type 2 diabetes mellitus with obesity (HCC)  - The target A1c for this patient is <7%, given the age and PMH, provided this can be achieved with no significant hypoglycemia.   - Reviewed recent labs, blood sugars and A1c, current diabetes regimen, food intake and meal times. I also reviewed all available self-testing BG results.     Improved control with A1c of

## 2024-09-29 ENCOUNTER — PATIENT MESSAGE (OUTPATIENT)
Dept: ENDOCRINOLOGY | Age: 19
End: 2024-09-29

## 2024-09-29 DIAGNOSIS — E66.9 TYPE 2 DIABETES MELLITUS WITH OBESITY (HCC): Primary | ICD-10-CM

## 2024-09-29 DIAGNOSIS — E11.69 TYPE 2 DIABETES MELLITUS WITH OBESITY (HCC): Primary | ICD-10-CM

## 2024-10-01 RX ORDER — TIRZEPATIDE 7.5 MG/.5ML
INJECTION, SOLUTION SUBCUTANEOUS
Qty: 2 ML | Refills: 1 | Status: SHIPPED | OUTPATIENT
Start: 2024-10-01

## 2024-11-22 DIAGNOSIS — E11.69 TYPE 2 DIABETES MELLITUS WITH OBESITY (HCC): ICD-10-CM

## 2024-11-22 DIAGNOSIS — E66.9 TYPE 2 DIABETES MELLITUS WITH OBESITY (HCC): ICD-10-CM

## 2024-11-22 RX ORDER — TIRZEPATIDE 7.5 MG/.5ML
INJECTION, SOLUTION SUBCUTANEOUS
Qty: 4 ML | Refills: 0 | Status: SHIPPED | OUTPATIENT
Start: 2024-11-22

## 2024-11-22 NOTE — TELEPHONE ENCOUNTER
Medication:   Requested Prescriptions     Pending Prescriptions Disp Refills    MOUNJARO 7.5 MG/0.5ML SOAJ [Pharmacy Med Name: Mounjaro 7.5 MG/0.5ML Subcutaneous Solution Pen-injector] 4 mL 0     Sig: INJECT 7 & 1/2 (SEVEN AND ONE-HALF) MG SUBCUTANEOUSLY  ONCE A WEEK     Last Filled:  10/01/2024    Last appt: 8/30/2024   Next appt: Visit date not found    Last Labs DM:   Lab Results   Component Value Date/Time    LABA1C 9.7 08/30/2024 12:23 PM

## 2024-12-23 DIAGNOSIS — E66.9 TYPE 2 DIABETES MELLITUS WITH OBESITY (HCC): ICD-10-CM

## 2024-12-23 DIAGNOSIS — E11.69 TYPE 2 DIABETES MELLITUS WITH OBESITY (HCC): ICD-10-CM

## 2024-12-23 RX ORDER — TIRZEPATIDE 10 MG/.5ML
INJECTION, SOLUTION SUBCUTANEOUS
Qty: 2 ML | Refills: 1 | Status: SHIPPED | OUTPATIENT
Start: 2024-12-23

## 2025-01-02 DIAGNOSIS — E11.69 TYPE 2 DIABETES MELLITUS WITH OBESITY (HCC): ICD-10-CM

## 2025-01-02 DIAGNOSIS — E66.9 TYPE 2 DIABETES MELLITUS WITH OBESITY (HCC): ICD-10-CM

## 2025-01-02 RX ORDER — TIRZEPATIDE 10 MG/.5ML
INJECTION, SOLUTION SUBCUTANEOUS
Qty: 2 ML | Refills: 1 | OUTPATIENT
Start: 2025-01-02

## 2025-01-08 ENCOUNTER — PATIENT MESSAGE (OUTPATIENT)
Dept: ENDOCRINOLOGY | Age: 20
End: 2025-01-08

## 2025-01-21 ENCOUNTER — TELEPHONE (OUTPATIENT)
Dept: ENDOCRINOLOGY | Age: 20
End: 2025-01-21

## 2025-01-21 ENCOUNTER — PATIENT MESSAGE (OUTPATIENT)
Dept: ENDOCRINOLOGY | Age: 20
End: 2025-01-21

## 2025-01-22 NOTE — TELEPHONE ENCOUNTER
Submitted PA for Mounjaro  [facility] Key: [id number] STATUS: PENDING.    Follow up done daily; if no decision with in three days we will refax.  If another three days goes by with no decision will call the insurance for status.

## 2025-01-23 NOTE — TELEPHONE ENCOUNTER
Approved on January 22 by [facility] 2017  Your PA request has been approved. As long as you remain covered by your prescription drug plan and there are no changes to your plan benefits, this request is approved from 01/22/2025 to 01/22/2028.  Authorization Expiration Date: 1/22/2028    If this requires a response please respond to the pool ( [facility] MEDICATION PRE-AUTH).      Thank you please advise patient.

## 2025-03-23 DIAGNOSIS — E66.9 TYPE 2 DIABETES MELLITUS WITH OBESITY (HCC): ICD-10-CM

## 2025-03-23 DIAGNOSIS — E11.69 TYPE 2 DIABETES MELLITUS WITH OBESITY (HCC): ICD-10-CM

## 2025-03-24 RX ORDER — TIRZEPATIDE 10 MG/.5ML
INJECTION, SOLUTION SUBCUTANEOUS
Qty: 4 ML | Refills: 0 | Status: SHIPPED | OUTPATIENT
Start: 2025-03-24

## 2025-04-07 RX ORDER — ACYCLOVIR 400 MG/1
TABLET ORAL
Qty: 6 EACH | Refills: 2 | Status: SHIPPED | OUTPATIENT
Start: 2025-04-07

## 2025-04-21 DIAGNOSIS — E11.69 TYPE 2 DIABETES MELLITUS WITH OBESITY (HCC): ICD-10-CM

## 2025-04-21 DIAGNOSIS — E66.9 TYPE 2 DIABETES MELLITUS WITH OBESITY (HCC): ICD-10-CM

## 2025-04-21 RX ORDER — TIRZEPATIDE 10 MG/.5ML
INJECTION, SOLUTION SUBCUTANEOUS
Qty: 4 ML | Refills: 0 | Status: SHIPPED | OUTPATIENT
Start: 2025-04-21

## 2025-06-09 ENCOUNTER — TELEPHONE (OUTPATIENT)
Dept: ENDOCRINOLOGY | Age: 20
End: 2025-06-09

## 2025-06-09 NOTE — TELEPHONE ENCOUNTER
Fax from [facility] w/ form   Pt left before RN discharge. Prior to leaving pt had been in waiting room sleeping.

## 2025-08-13 DIAGNOSIS — E66.9 TYPE 2 DIABETES MELLITUS WITH OBESITY (HCC): ICD-10-CM

## 2025-08-13 DIAGNOSIS — E11.69 TYPE 2 DIABETES MELLITUS WITH OBESITY (HCC): ICD-10-CM

## 2025-08-14 RX ORDER — TIRZEPATIDE 10 MG/.5ML
INJECTION, SOLUTION SUBCUTANEOUS
OUTPATIENT
Start: 2025-08-14

## (undated) DEVICE — AIR/WATER CLEANING ADAPTER FOR OLYMPUS® GI ENDOSCOPE: Brand: BULLDOG®

## (undated) DEVICE — FORCEPS BX L240CM WRK CHN 2.8MM STD CAP W/ NDL MIC MESH

## (undated) DEVICE — ENDOSCOPIC KIT 6X3/16 FT COLON W/ 1.1 OZ 2 GWN W/O BRSH

## (undated) DEVICE — SINGLE USE AIR/WATER, SUCTION AND BIOPSY VALVES SET: Brand: ORCAPOD™

## (undated) DEVICE — SOLUTION IV IRRIG WATER 500ML POUR BRL ST 2F7113

## (undated) DEVICE — BW-412T DISP COMBO CLEANING BRUSH: Brand: SINGLE USE COMBINATION CLEANING BRUSH

## (undated) DEVICE — MOUTHPIECE ENDOSCP L CTRL OPN AND SIDE PORTS DISP